# Patient Record
(demographics unavailable — no encounter records)

---

## 2025-05-22 NOTE — HISTORY OF PRESENT ILLNESS
[FreeTextEntry1] : Patient is a 81-year-old with dementia and hx total abdominal hysterectomy with bilateral salpingo-oophorectomy, has urinary frequency, nocturia, urgency incontinence, recurrent urinary tract infections, chronic diarrhea, rectal bleeding and fecal incontinence. Pt was last seen by Dr. Alfred on 07/25/2024 a year ago. She was advised to do MRI/defecography.  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 Moderate vaginal cuff descent during defecation without prolapse. Moderate anorectal junction descent during defecation w/o rectocele and rectal prolapse.   Pt presents today to renew Myrbetriq 25mg. States she is happy with Myrbetriq 25mg and it is helping her with OAB symptoms. States still leak some urine, but she is not bothered by it. She wears pads 24/7. She thinks that Dr. Alfred prescribed her this medication but there is no documentation stating that in her chart. Pt states she has dementia, and she doesn't remember clearly which doctor prescribed her Myrbetriq.  Denies UTI symptoms, vaginal bulge, pressure and constipation. She still has fecal urgency and rectal bleeding and seeing GI.   Her PVR is 30ml and BPP initially was 150/100 after 10 mins it was 137/90.

## 2025-05-22 NOTE — PROCEDURE
[Residual Volume ___ cc] : residual volume [unfilled] cc [No Complications] : no complications [Tolerated Well] : the patient tolerated the procedure well

## 2025-05-22 NOTE — DISCUSSION/SUMMARY
[FreeTextEntry1] : - Pt is taking Myrbetriq 25mg once daily in the morning and tolerating it well. She is still noticing some OAB symptoms, and I offered her to increase the dose of it. Pt refused and showed satisfaction with it. She doesn't want to increase it at this time.  /90 and PVR is 30ml. - Follow up in six months. - Instructed to call with any questions or concerns and she verbalizes understanding.